# Patient Record
Sex: MALE | Race: WHITE | NOT HISPANIC OR LATINO | Employment: STUDENT | URBAN - METROPOLITAN AREA
[De-identification: names, ages, dates, MRNs, and addresses within clinical notes are randomized per-mention and may not be internally consistent; named-entity substitution may affect disease eponyms.]

---

## 2017-03-22 ENCOUNTER — OFFICE VISIT (OUTPATIENT)
Dept: FAMILY MEDICINE | Facility: CLINIC | Age: 22
End: 2017-03-22
Payer: COMMERCIAL

## 2017-03-22 VITALS
BODY MASS INDEX: 31.88 KG/M2 | TEMPERATURE: 98 F | WEIGHT: 222.69 LBS | DIASTOLIC BLOOD PRESSURE: 73 MMHG | HEART RATE: 69 BPM | SYSTOLIC BLOOD PRESSURE: 120 MMHG | HEIGHT: 70 IN

## 2017-03-22 DIAGNOSIS — N45.1 EPIDIDYMITIS: Primary | ICD-10-CM

## 2017-03-22 PROCEDURE — 99999 PR PBB SHADOW E&M-EST. PATIENT-LVL III: CPT | Mod: PBBFAC,,, | Performed by: FAMILY MEDICINE

## 2017-03-22 PROCEDURE — 99213 OFFICE O/P EST LOW 20 MIN: CPT | Mod: S$GLB,,, | Performed by: FAMILY MEDICINE

## 2017-03-22 PROCEDURE — 1160F RVW MEDS BY RX/DR IN RCRD: CPT | Mod: S$GLB,,, | Performed by: FAMILY MEDICINE

## 2017-03-22 RX ORDER — DICLOFENAC SODIUM 50 MG/1
50 TABLET, DELAYED RELEASE ORAL 2 TIMES DAILY
Qty: 30 TABLET | Refills: 1 | Status: SHIPPED | OUTPATIENT
Start: 2017-03-22 | End: 2017-06-29

## 2017-03-22 NOTE — PROGRESS NOTES
The patient presents with tender painful rt testicle after playing softball last night     but no fever.  ROS:  General: No fever or sig wt change  HEENT:No other PND eye pain or dc  Respiratory: No cough wheezing  PE: vital signs noted  HEENT: Normocephalic,with no recent trauma,PERRLA,EOMI,conjunctiva injected with no exudate.Nasopharynx is injected and edematous.External otic canal edematous and injected TM dull  Neck:Supple without adenopathy  Chest:Clear bilateral breath sounds    Heart:Regular rhthym without murmer  Abdomen:Soft, non tender,no masses, no hepatosplenomegaly  Extremeties and Neurologic:Grossly within normal limits   : Nl appearing testicles w post rt hemiscrotal tenderness   No palpable ballotment   Impression:Epididymitis   Plan:   Rest nsaid and if ftp  con

## 2017-06-29 ENCOUNTER — OFFICE VISIT (OUTPATIENT)
Dept: FAMILY MEDICINE | Facility: CLINIC | Age: 22
End: 2017-06-29
Payer: COMMERCIAL

## 2017-06-29 VITALS
BODY MASS INDEX: 34.32 KG/M2 | TEMPERATURE: 98 F | SYSTOLIC BLOOD PRESSURE: 119 MMHG | HEIGHT: 69 IN | HEART RATE: 68 BPM | WEIGHT: 231.69 LBS | DIASTOLIC BLOOD PRESSURE: 66 MMHG

## 2017-06-29 DIAGNOSIS — K13.79 MOUTH SORE: Primary | ICD-10-CM

## 2017-06-29 DIAGNOSIS — R68.84 JAW PAIN: ICD-10-CM

## 2017-06-29 PROCEDURE — 99213 OFFICE O/P EST LOW 20 MIN: CPT | Mod: S$GLB,,, | Performed by: NURSE PRACTITIONER

## 2017-06-29 PROCEDURE — 99999 PR PBB SHADOW E&M-EST. PATIENT-LVL III: CPT | Mod: PBBFAC,,, | Performed by: NURSE PRACTITIONER

## 2017-06-29 RX ORDER — IBUPROFEN 800 MG/1
800 TABLET ORAL 3 TIMES DAILY
Qty: 21 TABLET | Refills: 0 | Status: SHIPPED | OUTPATIENT
Start: 2017-06-29 | End: 2017-07-06

## 2017-06-29 NOTE — PROGRESS NOTES
Subjective:       Patient ID: Melquiades Washington Jr. is a 21 y.o. male.    Chief Complaint: Other (mouth sore)    Pt in today for sore to right inner cheek. Pt states that he bit his jaw in his sleep last night and noticed a small sore to his inner cheek. He states that he has gargled with listerine, peroxide; states pain has worsened throughout the day. Pt has no other complaints today.       Past Medical History:   Diagnosis Date    Allergy      Social History     Social History    Marital status: Single     Spouse name: N/A    Number of children: N/A    Years of education: N/A     Occupational History         Social History Main Topics    Smoking status: Never Smoker    Smokeless tobacco: Not on file    Alcohol use No    Drug use: No    Sexual activity: Not on file     Past Surgical History:   Procedure Laterality Date    APPENDECTOMY  5/2011       Review of Systems   Constitutional: Negative.    HENT: Positive for mouth sores.    Eyes: Negative.    Respiratory: Negative.    Cardiovascular: Negative.    Gastrointestinal: Negative.    Endocrine: Negative.    Genitourinary: Negative.    Musculoskeletal: Negative.    Skin: Negative.    Allergic/Immunologic: Negative.    Neurological: Negative.    Psychiatric/Behavioral: Negative.        Objective:      Physical Exam   Constitutional: He is oriented to person, place, and time. He appears well-developed and well-nourished.   HENT:   Head: Normocephalic.   Right Ear: External ear normal.   Left Ear: External ear normal.   Nose: Nose normal.   Mouth/Throat: Uvula is midline and oropharynx is clear and moist.       Eyes: Conjunctivae are normal. Pupils are equal, round, and reactive to light.   Neck: Normal range of motion.   Cardiovascular: Normal rate, regular rhythm and normal heart sounds.    Pulmonary/Chest: Effort normal and breath sounds normal.   Abdominal: Soft. Bowel sounds are normal.   Musculoskeletal: Normal range of motion.   Neurological: He is alert  and oriented to person, place, and time.   Skin: Skin is warm and dry. Capillary refill takes 2 to 3 seconds.   Psychiatric: He has a normal mood and affect. His behavior is normal. Judgment and thought content normal.   Nursing note and vitals reviewed.      Assessment:       1. Mouth sore    2. Jaw pain        Plan:           Melquiades was seen today for other.    Diagnoses and all orders for this visit:    Mouth sore  Jaw pain  -     ibuprofen (ADVIL,MOTRIN) 800 MG tablet; Take 1 tablet (800 mg total) by mouth 3 (three) times daily.  -     (Magic mouthwash) 1:1:1 Benadryl 12.5mg/5ml liq, aluminum & magnesium hydroxide-simehticone (Maalox), lidocaine viscous 2%; Swish and spit 5 mLs every 8 (eight) hours as needed. for mouth sores  RTC if symptoms worsen or persist

## 2017-12-01 ENCOUNTER — OFFICE VISIT (OUTPATIENT)
Dept: FAMILY MEDICINE | Facility: CLINIC | Age: 22
End: 2017-12-01
Payer: COMMERCIAL

## 2017-12-01 VITALS
WEIGHT: 217 LBS | HEART RATE: 53 BPM | BODY MASS INDEX: 31.07 KG/M2 | TEMPERATURE: 98 F | SYSTOLIC BLOOD PRESSURE: 128 MMHG | DIASTOLIC BLOOD PRESSURE: 62 MMHG | HEIGHT: 70 IN

## 2017-12-01 DIAGNOSIS — R36.9 PENILE DISCHARGE: ICD-10-CM

## 2017-12-01 DIAGNOSIS — R30.0 DYSURIA: Primary | ICD-10-CM

## 2017-12-01 LAB
BILIRUB UR QL STRIP: NEGATIVE
CLARITY UR: CLEAR
COLOR UR: YELLOW
GLUCOSE UR QL STRIP: NEGATIVE
HGB UR QL STRIP: ABNORMAL
KETONES UR QL STRIP: NEGATIVE
LEUKOCYTE ESTERASE UR QL STRIP: NEGATIVE
NITRITE UR QL STRIP: NEGATIVE
PH UR STRIP: 7 [PH] (ref 5–8)
PROT UR QL STRIP: NEGATIVE
SP GR UR STRIP: 1.01 (ref 1–1.03)
URN SPEC COLLECT METH UR: ABNORMAL

## 2017-12-01 PROCEDURE — 87591 N.GONORRHOEAE DNA AMP PROB: CPT

## 2017-12-01 PROCEDURE — 99213 OFFICE O/P EST LOW 20 MIN: CPT | Mod: S$GLB,,, | Performed by: NURSE PRACTITIONER

## 2017-12-01 PROCEDURE — 99999 PR PBB SHADOW E&M-EST. PATIENT-LVL III: CPT | Mod: PBBFAC,,, | Performed by: NURSE PRACTITIONER

## 2017-12-01 PROCEDURE — 81002 URINALYSIS NONAUTO W/O SCOPE: CPT | Mod: PO

## 2017-12-01 PROCEDURE — 87086 URINE CULTURE/COLONY COUNT: CPT

## 2017-12-01 NOTE — PROGRESS NOTES
Subjective:      Patient ID: Melquiades Washington Jr. is a 22 y.o. male.    Chief Complaint: Dysuria and Urinary Frequency    Dysuria    This is a new problem. The current episode started in the past 7 days (5 days). The problem occurs every urination. The problem has been gradually worsening. The quality of the pain is described as burning. The patient is experiencing no pain. There has been no fever. He is sexually active. There is no history of pyelonephritis. Associated symptoms include frequency and urgency. Pertinent negatives include no behavior changes, chills, flank pain, hematuria, hesitancy, nausea, vomiting, constipation or rash. He has tried nothing for the symptoms. The treatment provided no relief. There is no history of kidney stones, recurrent UTIs or STD.   Penile Discharge   The patient's primary symptoms include penile discharge and penile pain. The patient's pertinent negatives include no genital lesions, scrotal swelling or testicular pain. This is a new problem. The current episode started in the past 7 days (5 days). The problem has been waxing and waning. The patient is experiencing no pain. Associated symptoms include dysuria, frequency and urgency. Pertinent negatives include no chest pain, chills, constipation, coughing, fever, flank pain, hesitancy, nausea, rash, shortness of breath or vomiting. The patient's penis is circumcised.The penile discharge was white. Nothing aggravates the symptoms. He has tried nothing for the symptoms. He is sexually active. He never uses condoms. No, his partner does not have an STD. There is no history of kidney stones.     Review of Systems   Constitutional: Negative for chills, fatigue and fever.   HENT: Negative.    Eyes: Negative.    Respiratory: Negative for cough, shortness of breath and wheezing.    Cardiovascular: Negative for chest pain, palpitations and leg swelling.   Gastrointestinal: Negative for constipation, nausea and vomiting.   Genitourinary:  "Positive for discharge, dysuria, frequency, penile pain and urgency. Negative for flank pain, hematuria, hesitancy, scrotal swelling and testicular pain.   Skin: Negative for rash and wound.   Neurological: Negative for weakness and numbness.   Psychiatric/Behavioral: The patient is not nervous/anxious.        Objective:     /62   Pulse (!) 53   Temp 98.3 °F (36.8 °C) (Oral)   Ht 5' 10" (1.778 m)   Wt 98.4 kg (217 lb)   BMI 31.14 kg/m²     Physical Exam   Constitutional: He is oriented to person, place, and time. He appears well-developed and well-nourished.   HENT:   Head: Normocephalic.   Eyes: Pupils are equal, round, and reactive to light.   Cardiovascular: Normal rate, regular rhythm and normal heart sounds.    Pulmonary/Chest: Effort normal and breath sounds normal. No respiratory distress. He has no wheezes. He has no rales.   Abdominal: Soft. Bowel sounds are normal. He exhibits no distension and no mass. There is tenderness (very mild tenderness) in the right lower quadrant and left lower quadrant. There is no rebound, no guarding and no CVA tenderness. Hernia confirmed negative in the right inguinal area and confirmed negative in the left inguinal area.   Genitourinary: Testes normal and penis normal. Circumcised.   Musculoskeletal: Normal range of motion.   Lymphadenopathy:     He has no cervical adenopathy.   Neurological: He is alert and oriented to person, place, and time.   Skin: Skin is warm and dry. No rash noted.   Psychiatric: He has a normal mood and affect. His behavior is normal. Judgment and thought content normal.   Vitals reviewed.    Assessment:     1. Dysuria    2. Penile discharge        Plan:   Dysuria  -     Urinalysis  -     Urine culture  -     C. trachomatis/N. gonorrhoeae by AMP DNA Urine    Penile discharge  -     C. trachomatis/N. gonorrhoeae by AMP DNA Urine    Increase fluids  Report to ER if symptoms worsen  Return if symptoms worsen or fail to improve.        "

## 2017-12-01 NOTE — PATIENT INSTRUCTIONS
"  Dysuria     Painful urination (dysuria) is often caused by a problem in the urinary tract.   Dysuria is pain felt during urination. It is often described as a burning. Learn more about this problem and how it can be treated.  What causes dysuria?  Possible causes include:  · Infection with a bacteria or virus such as a urinary tract infection (UTI or a sexually transmitted infection (STI)  · Sensitivity or allergy to chemicals such as those found in lotions and other products  · Prostate or bladder problems  · Radiation therapy to the pelvic area  How is dysuria diagnosed?  Your healthcare provider will examine you. He or she will ask about your symptoms and health. After talking with you and doing a physical exam, your healthcare provider may know what is causing your dysuria. He or she will usually request  a sample of your urine. Tests of your urine, or a "urinalysis," are done. A urinalysis may include:  · Looking at the urine sample (visual exam)  · Checking for substances (chemical exam)  · Looking at a small amount under a microscope (microscopic exam)  Some parts of the urinalysis may be done in the provider's office and some in a lab. And, the urine sample may be checked for bacteria and yeast (urine culture). Your healthcare provider will tell you more about these tests if they are needed.  How is dysuria treated?  Treatment depends on the cause. If you have a bacterial infection, you may need antibiotics. You may be given medicines to make it easier for you to urinate and help relieve pain. Your healthcare provider can tell you more about your treatment options. Untreated, symptoms may get worse.  When to call your healthcare provider  Call the healthcare provider right away if you have any of the following:  · Fever of 100.4°F (38°C) or higher   · No improvement after three days of treatment  · Trouble urinating because of pain  · New or increased discharge from the vagina or penis  · Rash or joint " pain  · Increased back or abdominal pain  · Enlarged painful lymph nodes (lumps) in the groin   Date Last Reviewed: 1/1/2017  © 7410-9775 [employer], [employer]. [address] [place] [address], [place] [address]. All rights reserved. This information is not intended as a substitute for professional medical care. Always follow your healthcare professional's instructions.

## 2017-12-02 LAB
BACTERIA UR CULT: NO GROWTH
C TRACH DNA SPEC QL NAA+PROBE: NOT DETECTED
N GONORRHOEA DNA SPEC QL NAA+PROBE: NOT DETECTED

## 2017-12-05 DIAGNOSIS — R30.0 DYSURIA: Primary | ICD-10-CM

## 2018-01-29 ENCOUNTER — OFFICE VISIT (OUTPATIENT)
Dept: FAMILY MEDICINE | Facility: CLINIC | Age: 23
End: 2018-01-29
Payer: COMMERCIAL

## 2018-01-29 VITALS
TEMPERATURE: 99 F | WEIGHT: 202.81 LBS | BODY MASS INDEX: 29.03 KG/M2 | SYSTOLIC BLOOD PRESSURE: 126 MMHG | DIASTOLIC BLOOD PRESSURE: 69 MMHG | HEART RATE: 76 BPM | HEIGHT: 70 IN

## 2018-01-29 DIAGNOSIS — R50.9 FEVER, UNSPECIFIED FEVER CAUSE: ICD-10-CM

## 2018-01-29 DIAGNOSIS — J06.9 UPPER RESPIRATORY TRACT INFECTION, UNSPECIFIED TYPE: Primary | ICD-10-CM

## 2018-01-29 DIAGNOSIS — J02.9 PHARYNGITIS, UNSPECIFIED ETIOLOGY: ICD-10-CM

## 2018-01-29 LAB
CTP QC/QA: YES
FLUAV AG NPH QL: NEGATIVE
FLUBV AG NPH QL: NEGATIVE

## 2018-01-29 PROCEDURE — 99999 PR PBB SHADOW E&M-EST. PATIENT-LVL III: CPT | Mod: PBBFAC,,, | Performed by: NURSE PRACTITIONER

## 2018-01-29 PROCEDURE — 87804 INFLUENZA ASSAY W/OPTIC: CPT | Mod: 59,QW,S$GLB, | Performed by: NURSE PRACTITIONER

## 2018-01-29 PROCEDURE — 87081 CULTURE SCREEN ONLY: CPT

## 2018-01-29 PROCEDURE — 99213 OFFICE O/P EST LOW 20 MIN: CPT | Mod: S$GLB,,, | Performed by: NURSE PRACTITIONER

## 2018-01-29 RX ORDER — NAPROXEN 500 MG/1
500 TABLET ORAL
COMMUNITY
Start: 2017-12-10 | End: 2018-09-24

## 2018-01-29 RX ORDER — AZITHROMYCIN 250 MG/1
TABLET, FILM COATED ORAL
Refills: 0 | COMMUNITY
Start: 2018-01-25 | End: 2018-09-24

## 2018-01-29 RX ORDER — ONDANSETRON 4 MG/1
4 TABLET, FILM COATED ORAL EVERY 8 HOURS PRN
Qty: 15 TABLET | Refills: 0 | Status: CANCELLED | OUTPATIENT
Start: 2018-01-29 | End: 2018-02-03

## 2018-01-29 NOTE — PROGRESS NOTES
"Subjective:      Patient ID: Melquiades Washington Jr. is a 22 y.o. male.    Chief Complaint: Sore Throat; Fever; and Diarrhea    HPI   Patient to clinic with complaint of fever, sore throat, post nasal drip, and diarrhea.  He reports he had a sore throat, post nasal drip, and nasal congestion Wednesday and Thursday of last week.  He was seen at an urgent care and given a Z-pack.  He reports he began to feel worse Saturday night and Sunday and had fever and then nausea and diarrhea.  He denies abdominal pain, blood in stool and dark stools.  He now has a cough, body aches, ear pain, and fatigue as well.  He has also been taking an OTC antihistamine.    Review of Systems   Constitutional: Positive for chills, fatigue and fever.   HENT: Positive for ear pain, postnasal drip, rhinorrhea, sore throat and trouble swallowing. Negative for congestion and sinus pressure.    Eyes: Negative.    Respiratory: Positive for cough. Negative for shortness of breath and wheezing.    Cardiovascular: Negative for chest pain, palpitations and leg swelling.   Gastrointestinal: Positive for diarrhea and nausea. Negative for abdominal pain, blood in stool, constipation and vomiting.   Endocrine: Negative.    Genitourinary: Negative.    Musculoskeletal: Positive for myalgias.   Skin: Negative for rash and wound.   Neurological: Negative for headaches.   Hematological: Negative.    Psychiatric/Behavioral: The patient is not nervous/anxious.        Objective:     /69   Pulse 76   Temp 98.6 °F (37 °C) (Oral)   Ht 5' 10" (1.778 m)   Wt 92 kg (202 lb 13.2 oz)   BMI 29.10 kg/m²     Physical Exam   Constitutional: He appears well-developed and well-nourished.   HENT:   Head: Normocephalic.   Right Ear: Tympanic membrane normal.   Left Ear: Tympanic membrane is injected.   Nose: Rhinorrhea (nasal mucosa boggy) present. No mucosal edema. Right sinus exhibits no maxillary sinus tenderness and no frontal sinus tenderness. Left sinus exhibits no " maxillary sinus tenderness and no frontal sinus tenderness.   Mouth/Throat: Posterior oropharyngeal edema (mild) and posterior oropharyngeal erythema (clear, post nasal drainage noted to posterior oropharynx) present. No oropharyngeal exudate. Tonsils are 2+ on the right. Tonsils are 2+ on the left. No tonsillar exudate.   Eyes: Conjunctivae and lids are normal. Pupils are equal, round, and reactive to light.   Neck: Normal range of motion and full passive range of motion without pain. Neck supple.   Cardiovascular: Normal rate, regular rhythm and normal heart sounds.    Pulmonary/Chest: Effort normal and breath sounds normal. No respiratory distress. He has no decreased breath sounds. He has no wheezes. He has no rhonchi. He has no rales.   Lymphadenopathy:     He has no cervical adenopathy.   Skin: Skin is warm and dry. No rash noted.   Psychiatric: He has a normal mood and affect. His behavior is normal. Judgment and thought content normal.     POCT influenza negative  Strep test negative, culture pending    Assessment:     1. Upper respiratory tract infection, unspecified type    2. Pharyngitis, unspecified etiology    3. Fever, unspecified fever cause        Plan:     Problem List Items Addressed This Visit     None      Visit Diagnoses     Upper respiratory tract infection, unspecified type    -  Primary    Pharyngitis, unspecified etiology        Relevant Orders    Strep A culture, throat    Fever, unspecified fever cause        Relevant Orders    POCT Influenza A/B (Completed)    THROAT SCREEN, RAPID    Strep A culture, throat      Increase fluid intake  Rest  Tylenol/Motrin as needed for headache/pain/fever  OTC decongestant like Sudafed for congestion  Mucinex (guaifenesin) twice daily to loosen secretions  Humidified air  Lozenges and/or chloraseptic spray as needed for sore throat  Warm salt water gargles as needed for sore throat  Symptomatic care discussed and educational handout provided  Report to ER  if symptoms worsen    Follow-up if symptoms worsen or fail to improve.

## 2018-01-29 NOTE — PATIENT INSTRUCTIONS
Increase fluid intake  Rest  Tylenol/Motrin as needed for headache/pain/fever   OTC decongestant like Sudafed for congestion  Mucinex (guaifenesin) twice daily to loosen secretions  Humidified air  Lozenges and/or chloraseptic spray as needed for sore throat  Warm salt water gargles as needed for sore throat

## 2018-02-01 LAB — BACTERIA THROAT CULT: NORMAL

## 2018-09-24 ENCOUNTER — OFFICE VISIT (OUTPATIENT)
Dept: FAMILY MEDICINE | Facility: CLINIC | Age: 23
End: 2018-09-24
Payer: COMMERCIAL

## 2018-09-24 VITALS
WEIGHT: 200 LBS | HEART RATE: 71 BPM | DIASTOLIC BLOOD PRESSURE: 57 MMHG | SYSTOLIC BLOOD PRESSURE: 109 MMHG | HEIGHT: 70 IN | BODY MASS INDEX: 28.63 KG/M2 | TEMPERATURE: 99 F

## 2018-09-24 DIAGNOSIS — R10.33 ACUTE PERIUMBILICAL PAIN: Primary | ICD-10-CM

## 2018-09-24 PROCEDURE — 99999 PR PBB SHADOW E&M-EST. PATIENT-LVL III: CPT | Mod: PBBFAC,,, | Performed by: NURSE PRACTITIONER

## 2018-09-24 PROCEDURE — 99213 OFFICE O/P EST LOW 20 MIN: CPT | Mod: S$GLB,,, | Performed by: NURSE PRACTITIONER

## 2018-09-24 PROCEDURE — 3008F BODY MASS INDEX DOCD: CPT | Mod: CPTII,S$GLB,, | Performed by: NURSE PRACTITIONER

## 2018-09-24 NOTE — PROGRESS NOTES
"Subjective:      Patient ID: Melquiades Washington Jr. is a 23 y.o. male.    Chief Complaint: Abdominal Pain    Abdominal Pain   This is a new problem. The current episode started today. The onset quality is sudden. The problem occurs intermittently. The problem has been waxing and waning. The pain is located in the periumbilical region. The pain is moderate. The quality of the pain is sharp. The abdominal pain does not radiate. Pertinent negatives include no belching, constipation, diarrhea, dysuria, fever, flatus, frequency, headaches, hematuria, melena, myalgias, nausea or vomiting. The pain is aggravated by movement (increased intraabdominal pressure). Relieved by: relaxation  He has tried nothing for the symptoms. The treatment provided no relief.   On Saturday he lifted heavy ice chests and had a slip and fall pushing an ice chest out of a truck.      Review of Systems   Constitutional: Negative for chills, fatigue and fever.   Respiratory: Negative for cough, shortness of breath and wheezing.    Cardiovascular: Negative for chest pain, palpitations and leg swelling.   Gastrointestinal: Positive for abdominal pain. Negative for abdominal distention, constipation, diarrhea, flatus, melena, nausea and vomiting.   Genitourinary: Negative for dysuria, frequency and hematuria.   Musculoskeletal: Negative for myalgias.   Skin: Negative for rash and wound.   Neurological: Negative for weakness, numbness and headaches.   Psychiatric/Behavioral: The patient is not nervous/anxious.        Objective:     BP (!) 109/57   Pulse 71   Temp 98.5 °F (36.9 °C) (Oral)   Ht 5' 10" (1.778 m)   Wt 90.7 kg (200 lb)   BMI 28.70 kg/m²     Physical Exam   Constitutional: He is oriented to person, place, and time. He appears well-developed and well-nourished.   HENT:   Head: Normocephalic.   Eyes: Pupils are equal, round, and reactive to light.   Neck: Normal range of motion. Neck supple.   Cardiovascular: Normal rate, regular rhythm and " normal heart sounds.   Pulmonary/Chest: Effort normal and breath sounds normal. No respiratory distress. He has no decreased breath sounds. He has no wheezes. He has no rhonchi. He has no rales.   Abdominal: Soft. Bowel sounds are normal. He exhibits no distension and no mass. There is tenderness in the periumbilical area. There is no rigidity, no rebound, no guarding and no CVA tenderness. No hernia.   Neurological: He is alert and oriented to person, place, and time.   Skin: Skin is warm and dry. No rash noted.   Psychiatric: He has a normal mood and affect. His behavior is normal. Judgment and thought content normal.   Vitals reviewed.    Assessment:     1. Acute periumbilical pain        Plan:     Problem List Items Addressed This Visit     None      Visit Diagnoses     Acute periumbilical pain    -  Primary    Relevant Orders    US Abdomen Limited      Periumbilical pain caused by hernia vs strained muscle vs other etiology  Report to ER if symptoms worsen    Follow-up if symptoms worsen or fail to improve.        Parts of this note was dictated using voice recognition software. Please excuse any grammatical or typographical errors.

## 2018-09-25 ENCOUNTER — HOSPITAL ENCOUNTER (OUTPATIENT)
Dept: RADIOLOGY | Facility: HOSPITAL | Age: 23
Discharge: HOME | End: 2018-09-25
Attending: NURSE PRACTITIONER
Payer: COMMERCIAL

## 2018-09-25 DIAGNOSIS — R10.33 ACUTE PERIUMBILICAL PAIN: ICD-10-CM

## 2018-09-25 PROCEDURE — 76705 ECHO EXAM OF ABDOMEN: CPT | Mod: TC,PO

## 2018-09-25 PROCEDURE — 76705 ECHO EXAM OF ABDOMEN: CPT | Mod: 26,,, | Performed by: RADIOLOGY

## 2019-07-10 ENCOUNTER — OFFICE VISIT (OUTPATIENT)
Dept: FAMILY MEDICINE | Facility: CLINIC | Age: 24
End: 2019-07-10
Payer: COMMERCIAL

## 2019-07-10 VITALS
TEMPERATURE: 98 F | HEART RATE: 57 BPM | SYSTOLIC BLOOD PRESSURE: 95 MMHG | WEIGHT: 226.81 LBS | BODY MASS INDEX: 32.47 KG/M2 | DIASTOLIC BLOOD PRESSURE: 56 MMHG | HEIGHT: 70 IN

## 2019-07-10 DIAGNOSIS — R00.2 PALPITATIONS: ICD-10-CM

## 2019-07-10 DIAGNOSIS — F43.9 STRESS: ICD-10-CM

## 2019-07-10 DIAGNOSIS — K21.9 GASTROESOPHAGEAL REFLUX DISEASE, ESOPHAGITIS PRESENCE NOT SPECIFIED: ICD-10-CM

## 2019-07-10 DIAGNOSIS — R07.9 CHEST PAIN, UNSPECIFIED TYPE: Primary | ICD-10-CM

## 2019-07-10 PROCEDURE — 3008F BODY MASS INDEX DOCD: CPT | Mod: CPTII,S$GLB,, | Performed by: FAMILY MEDICINE

## 2019-07-10 PROCEDURE — 99214 PR OFFICE/OUTPT VISIT, EST, LEVL IV, 30-39 MIN: ICD-10-PCS | Mod: S$GLB,,, | Performed by: FAMILY MEDICINE

## 2019-07-10 PROCEDURE — 99999 PR PBB SHADOW E&M-EST. PATIENT-LVL III: CPT | Mod: PBBFAC,,, | Performed by: FAMILY MEDICINE

## 2019-07-10 PROCEDURE — 93010 ELECTROCARDIOGRAM REPORT: CPT | Mod: S$GLB,,, | Performed by: INTERNAL MEDICINE

## 2019-07-10 PROCEDURE — 99999 PR PBB SHADOW E&M-EST. PATIENT-LVL III: ICD-10-PCS | Mod: PBBFAC,,, | Performed by: FAMILY MEDICINE

## 2019-07-10 PROCEDURE — 93005 EKG 12-LEAD: ICD-10-PCS | Mod: S$GLB,,, | Performed by: FAMILY MEDICINE

## 2019-07-10 PROCEDURE — 93005 ELECTROCARDIOGRAM TRACING: CPT | Mod: S$GLB,,, | Performed by: FAMILY MEDICINE

## 2019-07-10 PROCEDURE — 3008F PR BODY MASS INDEX (BMI) DOCUMENTED: ICD-10-PCS | Mod: CPTII,S$GLB,, | Performed by: FAMILY MEDICINE

## 2019-07-10 PROCEDURE — 99214 OFFICE O/P EST MOD 30 MIN: CPT | Mod: S$GLB,,, | Performed by: FAMILY MEDICINE

## 2019-07-10 PROCEDURE — 93010 EKG 12-LEAD: ICD-10-PCS | Mod: S$GLB,,, | Performed by: INTERNAL MEDICINE

## 2019-07-10 RX ORDER — PANTOPRAZOLE SODIUM 40 MG/1
40 TABLET, DELAYED RELEASE ORAL DAILY
Qty: 30 TABLET | Refills: 0 | Status: SHIPPED | OUTPATIENT
Start: 2019-07-10 | End: 2020-01-22

## 2019-07-10 NOTE — PROGRESS NOTES
Patient presents with a complaint of intermittent chest discomfort.  Symptoms are relatively constant over the past 3 and half days though occasionally resolves.  Possibly has had some palpitation or heart racing.  Feels like he is having symptoms currently.  Denies exertional chest pain.  He did feel like symptoms radiate somewhat towards the jaw teeth and left shoulder area.  Achy sensation.  Seems to be better after eating.  States he has had some indigestion and some intermittent mild reflux sensation past few days.  He did have excess alcohol over the weekend with 10-15 beers on Saturday.  States usually drinks about 3 or 4.  Denies any abdominal pain.  No nausea or vomiting.  No shortness of breath.  He has been under some stress related to a relationship break-up about 6 months ago as well as his father passing away about 2 years ago.  Denies being chronically depressed.  No SI/HI.  Denies chronic anxiety.  Some stress related to finances.    Melquiades was seen today for chest discomfort.    Diagnoses and all orders for this visit:    Chest pain, unspecified type  -     EKG 12-lead  -     Cardiac treadmill stress test; Future    Palpitations  -     EKG 12-lead  -     Cardiac treadmill stress test; Future    Gastroesophageal reflux disease, esophagitis presence not specified    Stress    Other orders  -     pantoprazole (PROTONIX) 40 MG tablet; Take 1 tablet (40 mg total) by mouth once daily.      EKG today sinus bradycardia with sinus arrhythmia otherwise unremarkable.  This is with palpitation symptoms.  Arrange stress testing as above.  Treat for reflux.  Avoid excess alcohol.  He does not feel having a know if stress type symptoms for medication at this point.  Follow up if new symptoms develop symptoms worsen or not improving with above.            Past Medical History:  Past Medical History:   Diagnosis Date    Allergy      Past Surgical History:   Procedure Laterality Date    APPENDECTOMY  5/2011      Review of patient's allergies indicates:   Allergen Reactions    Amoxicillin      Other reaction(s): Rash    Bactrim  [sulfamethoxazole-trimethoprim]      Other reaction(s): Hives    Penicillins      Other reaction(s): Rash  Other reaction(s): Hives    Sulfa (sulfonamide antibiotics)      Other reaction(s): Hives  Other reaction(s): Edema     No current outpatient medications on file prior to visit.     No current facility-administered medications on file prior to visit.      Social History     Socioeconomic History    Marital status: Single     Spouse name: Not on file    Number of children: Not on file    Years of education: Not on file    Highest education level: Not on file   Occupational History    Not on file   Social Needs    Financial resource strain: Not on file    Food insecurity:     Worry: Not on file     Inability: Not on file    Transportation needs:     Medical: Not on file     Non-medical: Not on file   Tobacco Use    Smoking status: Current Every Day Smoker     Types: Vaping with nicotine   Substance and Sexual Activity    Alcohol use: Yes     Drinks per session: 3 or 4    Drug use: No    Sexual activity: Not on file   Lifestyle    Physical activity:     Days per week: Not on file     Minutes per session: Not on file    Stress: Not on file   Relationships    Social connections:     Talks on phone: Not on file     Gets together: Not on file     Attends [employer] service: Not on file     Active member of club or organization: Not on file     Attends meetings of clubs or organizations: Not on file     Relationship status: Not on file   Other Topics Concern    Not on file   Social History Narrative    Not on file     Family History   Problem Relation Age of Onset    Diabetes Father     Diabetes Paternal Grandmother            ROS:  GENERAL: No fever, chills,  or significant weight changes.   CARDIOVASCULAR: Denies  PND, orthopnea or reduced exercise tolerance.  ABDOMEN:  "Appetite fine. Denies diarrhea, abdominal pain, hematemesis or blood in stool.  URINARY: No flank pain, dysuria or hematuria.    Vitals:    07/10/19 1043   BP: (!) 95/56   Pulse: (!) 57   Temp: 98.3 °F (36.8 °C)   Weight: 102.9 kg (226 lb 12.8 oz)   Height: 5' 10" (1.778 m)     Wt Readings from Last 3 Encounters:   07/10/19 102.9 kg (226 lb 12.8 oz)   09/24/18 90.7 kg (200 lb)   01/29/18 92 kg (202 lb 13.2 oz)       OBJECTIVE:   APPEARANCE: Well nourished, well developed, in no acute distress.    HEAD: Normocephalic.  Atraumatic.  No sinus tenderness.  EYES:   Right eye: Pupil reactive.  Conjunctiva clear.    Left eye: Pupil reactive.  Conjunctiva clear.  EOMI.    EARS: TM's intact. Light reflex normal. No retraction or perforation.    NOSE:  clear.  MOUTH & THROAT:  No pharyngeal erythema or exudate. No lesions.  NECK: Supple. No bruits.  No JVD.  No cervical lymphadenopathy.  No thyromegaly.    CHEST: Breath sounds clear bilaterally.  Normal respiratory effort  CARDIOVASCULAR: Normal rate.  Regular rhythm.  No murmurs.  No rub.  No gallops.  ABDOMEN: Bowel sounds normal.  Soft.  No tenderness.  No organomegaly.  PERIPHERAL VASCULAR: No cyanosis.  No clubbing.  No edema.  NEUROLOGIC: No focal findings.  MENTAL STATUS: Alert.  Oriented x 3.        "

## 2020-01-22 ENCOUNTER — OFFICE VISIT (OUTPATIENT)
Dept: FAMILY MEDICINE | Facility: CLINIC | Age: 25
End: 2020-01-22
Payer: COMMERCIAL

## 2020-01-22 ENCOUNTER — LAB VISIT (OUTPATIENT)
Dept: LAB | Facility: HOSPITAL | Age: 25
End: 2020-01-22
Attending: NURSE PRACTITIONER
Payer: COMMERCIAL

## 2020-01-22 VITALS
DIASTOLIC BLOOD PRESSURE: 68 MMHG | HEIGHT: 68 IN | TEMPERATURE: 100 F | WEIGHT: 208 LBS | SYSTOLIC BLOOD PRESSURE: 126 MMHG | BODY MASS INDEX: 31.52 KG/M2 | HEART RATE: 98 BPM

## 2020-01-22 DIAGNOSIS — R52 BODY ACHES: ICD-10-CM

## 2020-01-22 DIAGNOSIS — R50.9 FEVER, UNSPECIFIED FEVER CAUSE: ICD-10-CM

## 2020-01-22 DIAGNOSIS — J06.9 UPPER RESPIRATORY TRACT INFECTION, UNSPECIFIED TYPE: Primary | ICD-10-CM

## 2020-01-22 DIAGNOSIS — J02.9 SORE THROAT: ICD-10-CM

## 2020-01-22 LAB
DEPRECATED S PYO AG THROAT QL EIA: NEGATIVE
HETEROPH AB SERPL QL IA: NEGATIVE
INFLUENZA A, MOLECULAR: NEGATIVE
INFLUENZA B, MOLECULAR: NEGATIVE
SPECIMEN SOURCE: NORMAL
WBC # BLD AUTO: 11.07 K/UL (ref 3.9–12.7)

## 2020-01-22 PROCEDURE — 87880 STREP A ASSAY W/OPTIC: CPT | Mod: PO

## 2020-01-22 PROCEDURE — 3008F PR BODY MASS INDEX (BMI) DOCUMENTED: ICD-10-PCS | Mod: CPTII,S$GLB,, | Performed by: NURSE PRACTITIONER

## 2020-01-22 PROCEDURE — 36415 COLL VENOUS BLD VENIPUNCTURE: CPT | Mod: PO

## 2020-01-22 PROCEDURE — 99213 OFFICE O/P EST LOW 20 MIN: CPT | Mod: S$GLB,,, | Performed by: NURSE PRACTITIONER

## 2020-01-22 PROCEDURE — 99999 PR PBB SHADOW E&M-EST. PATIENT-LVL III: ICD-10-PCS | Mod: PBBFAC,,, | Performed by: NURSE PRACTITIONER

## 2020-01-22 PROCEDURE — 85048 AUTOMATED LEUKOCYTE COUNT: CPT

## 2020-01-22 PROCEDURE — 86308 HETEROPHILE ANTIBODY SCREEN: CPT | Mod: PO

## 2020-01-22 PROCEDURE — 87081 CULTURE SCREEN ONLY: CPT

## 2020-01-22 PROCEDURE — 87502 INFLUENZA DNA AMP PROBE: CPT | Mod: PO

## 2020-01-22 PROCEDURE — 3008F BODY MASS INDEX DOCD: CPT | Mod: CPTII,S$GLB,, | Performed by: NURSE PRACTITIONER

## 2020-01-22 PROCEDURE — 99213 PR OFFICE/OUTPT VISIT, EST, LEVL III, 20-29 MIN: ICD-10-PCS | Mod: S$GLB,,, | Performed by: NURSE PRACTITIONER

## 2020-01-22 PROCEDURE — 87147 CULTURE TYPE IMMUNOLOGIC: CPT

## 2020-01-22 PROCEDURE — 99999 PR PBB SHADOW E&M-EST. PATIENT-LVL III: CPT | Mod: PBBFAC,,, | Performed by: NURSE PRACTITIONER

## 2020-01-22 RX ORDER — AZITHROMYCIN 250 MG/1
TABLET, FILM COATED ORAL
Qty: 6 TABLET | Refills: 0 | Status: SHIPPED | OUTPATIENT
Start: 2020-01-22 | End: 2020-01-27

## 2020-01-22 NOTE — PATIENT INSTRUCTIONS
Alternate motrin and tylenol every 4-6h prn  Hydrate well  Report to ER immediately if symptoms worsen

## 2020-01-22 NOTE — PROGRESS NOTES
Subjective:       Patient ID: Melquiades Washington Jr. is a 24 y.o. male.    Chief Complaint: Sore Throat; Cough; Neck Pain; Fever; and Nasal Congestion    Sore Throat    This is a new problem. The current episode started in the past 7 days. The problem has been unchanged. The maximum temperature recorded prior to his arrival was 101 - 101.9 F. The fever has been present for 1 to 2 days. The pain is moderate. Associated symptoms include neck pain, swollen glands and trouble swallowing. Pertinent negatives include no abdominal pain, congestion, coughing, diarrhea, drooling, ear discharge, ear pain, headaches, hoarse voice, plugged ear sensation, shortness of breath, stridor or vomiting. He has had no exposure to strep or mono. He has tried nothing for the symptoms. The treatment provided no relief.     Past Medical History:   Diagnosis Date    Allergy      Social History     Socioeconomic History    Marital status: Single     Spouse name: Not on file    Number of children: Not on file    Years of education: Not on file    Highest education level: Not on file   Occupational History    Not on file   Social Needs    Financial resource strain: Not on file    Food insecurity:     Worry: Not on file     Inability: Not on file    Transportation needs:     Medical: Not on file     Non-medical: Not on file   Tobacco Use    Smoking status: Current Every Day Smoker     Types: Vaping with nicotine   Substance and Sexual Activity    Alcohol use: Yes     Drinks per session: 3 or 4    Drug use: No    Sexual activity: Not on file   Lifestyle    Physical activity:     Days per week: Not on file     Minutes per session: Not on file    Stress: Not on file   Relationships    Social connections:     Talks on phone: Not on file     Gets together: Not on file     Attends Gnosticist service: Not on file     Active member of club or organization: Not on file     Attends meetings of clubs or organizations: Not on file     Relationship  status: Not on file   Other Topics Concern    Not on file   Social History Narrative    Not on file     Past Surgical History:   Procedure Laterality Date    APPENDECTOMY  5/2011       Review of Systems   Constitutional: Negative.    HENT: Positive for sore throat and trouble swallowing. Negative for congestion, drooling, ear discharge, ear pain and hoarse voice.    Eyes: Negative.    Respiratory: Negative.  Negative for cough, shortness of breath and stridor.    Cardiovascular: Negative.    Gastrointestinal: Negative.  Negative for abdominal pain, diarrhea and vomiting.   Endocrine: Negative.    Genitourinary: Negative.    Musculoskeletal: Positive for neck pain.   Skin: Negative.    Allergic/Immunologic: Negative.    Neurological: Negative.  Negative for headaches.   Psychiatric/Behavioral: Negative.        Objective:      Physical Exam   Constitutional: He is oriented to person, place, and time. He appears well-developed and well-nourished.   HENT:   Head: Normocephalic.   Right Ear: Hearing, tympanic membrane, external ear and ear canal normal.   Left Ear: Hearing, tympanic membrane, external ear and ear canal normal.   Nose: Rhinorrhea present. Right sinus exhibits no maxillary sinus tenderness and no frontal sinus tenderness. Left sinus exhibits no maxillary sinus tenderness and no frontal sinus tenderness.   Mouth/Throat: Uvula is midline and mucous membranes are normal. Posterior oropharyngeal erythema present.   Eyes: Pupils are equal, round, and reactive to light. Conjunctivae are normal.   Neck: Normal range of motion. Neck supple.   Cardiovascular: Normal rate, regular rhythm and normal heart sounds.   Pulmonary/Chest: Effort normal and breath sounds normal.   Abdominal: Soft. Bowel sounds are normal.   Musculoskeletal: Normal range of motion.   Neurological: He is alert and oriented to person, place, and time.   Skin: Skin is warm and dry. Capillary refill takes 2 to 3 seconds.   Psychiatric: He has  a normal mood and affect. His behavior is normal. Judgment and thought content normal.   Nursing note and vitals reviewed.      Assessment:       1. Upper respiratory tract infection, unspecified type   2. Body aches    3.      Sore throat   4.      Fever, unspecified fever cause  Plan:           Melquiades was seen today for sore throat, cough, neck pain, fever and nasal congestion.    Diagnoses and all orders for this visit:    Upper respiratory tract infection, unspecified type  Sore throat  Body aches  Fever, unspecified fever cause  -     Throat Screen, Rapid  -     HETEROPHILE AB SCREEN; Future  -     Influenza A & B by Molecular  -     WBC; Future  -     (Magic mouthwash) 1:1:1 Benadryl 12.5mg/5ml liq, aluminum & magnesium hydroxide-simehticone (Maalox), lidocaine viscous 2%; Swish and spit 5 mLs every 8 (eight) hours as needed. Gargle and spit. Do not swallow  -     Strep A culture, throat  -     azithromycin (Z-SHANNA) 250 MG tablet; Take 2 tablets by mouth on day 1; Take 1 tablet by mouth on days 2-5  Alternate motrin and tylenol every 4-6h prn  Hydrate well  Report to ER immediately if symptoms worsen

## 2020-01-23 LAB
BACTERIA THROAT CULT: ABNORMAL
BACTERIA THROAT CULT: ABNORMAL

## 2020-07-14 ENCOUNTER — TELEPHONE (OUTPATIENT)
Dept: FAMILY MEDICINE | Facility: CLINIC | Age: 25
End: 2020-07-14

## 2020-07-14 DIAGNOSIS — U07.1 COVID-19 VIRUS INFECTION: ICD-10-CM

## 2020-07-14 DIAGNOSIS — U07.1 COVID-19 VIRUS DETECTED: ICD-10-CM

## 2020-07-14 NOTE — TELEPHONE ENCOUNTER
We have seen some patients continue to test positive for COVID-19 well after they have recovered from illness, in some cases >80 days. However, the CDC has concluded that patients are likely no longer infectious 10 days after symptom recovery.  . The  CDC has  data  showing that nine days after illness onset, the Covid 19 virus could not be replicated in culture.     Based on this evidence patients with a documented positive COVID-19 test do not need to re-test for clearance. Repeat positive tests are not indicative of continued infectivity. Because the date of symptom onset is not always known, and some patients never develop symptoms, [facility] protocols use 10 days from the first positive result and being symptom-free to determine when someone can be released from isolation.    [facility] now has a lockout period of 90 days where Patients with a documented positive COVID-19 result cannot have another COVID-19 test ordered during that 90-day period.

## 2020-07-14 NOTE — TELEPHONE ENCOUNTER
----- Message from Elbert Ornelas sent at 7/14/2020 10:48 AM CDT -----  Name of Who is Calling: MICHAEL RETANA [[id number]]    What is the request in detail: Patient is requesting a call back regard to getting covid 19 orders....Please contact to further discuss and advise      Can the clinic reply by [facility]: No     What Number to Call Back if not in [facility]:  [phone] (work)  Can the clinic reply in [facility]:

## 2020-12-10 ENCOUNTER — OFFICE VISIT (OUTPATIENT)
Dept: FAMILY MEDICINE | Facility: CLINIC | Age: 25
End: 2020-12-10
Payer: COMMERCIAL

## 2020-12-10 VITALS
SYSTOLIC BLOOD PRESSURE: 112 MMHG | HEIGHT: 68 IN | BODY MASS INDEX: 30.31 KG/M2 | TEMPERATURE: 98 F | HEART RATE: 57 BPM | DIASTOLIC BLOOD PRESSURE: 57 MMHG | WEIGHT: 200 LBS

## 2020-12-10 DIAGNOSIS — J06.9 UPPER RESPIRATORY TRACT INFECTION, UNSPECIFIED TYPE: ICD-10-CM

## 2020-12-10 DIAGNOSIS — Z03.818 ENCOUNTER FOR OBSERVATION FOR SUSPECTED EXPOSURE TO OTHER BIOLOGICAL AGENTS RULED OUT: ICD-10-CM

## 2020-12-10 DIAGNOSIS — J02.9 SORE THROAT: Primary | ICD-10-CM

## 2020-12-10 LAB — GROUP A STREP, MOLECULAR: NEGATIVE

## 2020-12-10 PROCEDURE — 99999 PR PBB SHADOW E&M-EST. PATIENT-LVL III: CPT | Mod: PBBFAC,CS,, | Performed by: FAMILY MEDICINE

## 2020-12-10 PROCEDURE — 99213 PR OFFICE/OUTPT VISIT, EST, LEVL III, 20-29 MIN: ICD-10-PCS | Mod: S$GLB,CS,, | Performed by: FAMILY MEDICINE

## 2020-12-10 PROCEDURE — 99213 OFFICE O/P EST LOW 20 MIN: CPT | Mod: S$GLB,CS,, | Performed by: FAMILY MEDICINE

## 2020-12-10 PROCEDURE — 3008F PR BODY MASS INDEX (BMI) DOCUMENTED: ICD-10-PCS | Mod: CPTII,S$GLB,, | Performed by: FAMILY MEDICINE

## 2020-12-10 PROCEDURE — 87651 STREP A DNA AMP PROBE: CPT | Mod: PO

## 2020-12-10 PROCEDURE — 3008F BODY MASS INDEX DOCD: CPT | Mod: CPTII,S$GLB,, | Performed by: FAMILY MEDICINE

## 2020-12-10 PROCEDURE — U0003 INFECTIOUS AGENT DETECTION BY NUCLEIC ACID (DNA OR RNA); SEVERE ACUTE RESPIRATORY SYNDROME CORONAVIRUS 2 (SARS-COV-2) (CORONAVIRUS DISEASE [COVID-19]), AMPLIFIED PROBE TECHNIQUE, MAKING USE OF HIGH THROUGHPUT TECHNOLOGIES AS DESCRIBED BY CMS-2020-01-R: HCPCS

## 2020-12-10 PROCEDURE — 99999 PR PBB SHADOW E&M-EST. PATIENT-LVL III: ICD-10-PCS | Mod: PBBFAC,CS,, | Performed by: FAMILY MEDICINE

## 2020-12-10 PROCEDURE — 1126F PR PAIN SEVERITY QUANTIFIED, NO PAIN PRESENT: ICD-10-PCS | Mod: S$GLB,,, | Performed by: FAMILY MEDICINE

## 2020-12-10 PROCEDURE — 1126F AMNT PAIN NOTED NONE PRSNT: CPT | Mod: S$GLB,,, | Performed by: FAMILY MEDICINE

## 2020-12-11 LAB — SARS-COV-2 RNA RESP QL NAA+PROBE: NOT DETECTED

## 2020-12-11 NOTE — PROGRESS NOTES
Patient complains of sore throat, mild congestion, postnasal drainage, no cough, no fever.  Symptoms started over the past few days.  Current treatment over-the-counter medication.  He had COVID-19 back during the summer.  Denies anosmia.  No diarrhea or headache.    Past Medical History:  Past Medical History:   Diagnosis Date    Allergy     COVID-19 virus infection 6/29/2020     Past Surgical History:   Procedure Laterality Date    APPENDECTOMY  5/2011     Review of patient's allergies indicates:   Allergen Reactions    Amoxicillin      Other reaction(s): Rash    Bactrim  [sulfamethoxazole-trimethoprim]      Other reaction(s): Hives    Penicillins      Other reaction(s): Rash  Other reaction(s): Hives    Sulfa (sulfonamide antibiotics)      Other reaction(s): Hives  Other reaction(s): Edema     No current outpatient medications on file prior to visit.     No current facility-administered medications on file prior to visit.      Social History     Socioeconomic History    Marital status: Single     Spouse name: Not on file    Number of children: Not on file    Years of education: Not on file    Highest education level: Not on file   Occupational History    Not on file   Social Needs    Financial resource strain: Not on file    Food insecurity     Worry: Not on file     Inability: Not on file    Transportation needs     Medical: Not on file     Non-medical: Not on file   Tobacco Use    Smoking status: Current Every Day Smoker     Types: Vaping with nicotine   Substance and Sexual Activity    Alcohol use: Yes     Drinks per session: 3 or 4    Drug use: No    Sexual activity: Not on file   Lifestyle    Physical activity     Days per week: Not on file     Minutes per session: Not on file    Stress: Not on file   Relationships    Social connections     Talks on phone: Not on file     Gets together: Not on file     Attends Congregational service: Not on file     Active member of club or organization:  "Not on file     Attends meetings of clubs or organizations: Not on file     Relationship status: Not on file   Other Topics Concern    Not on file   Social History Narrative    Not on file     Family History   Problem Relation Age of Onset    Diabetes Father     Diabetes Paternal Grandmother              Physical Exam:  Vitals:    12/10/20 0906   BP: (!) 112/57   Pulse: (!) 57   Temp: 98.2 °F (36.8 °C)   Weight: 90.7 kg (200 lb)   Height: 5' 8" (1.727 m)       Gen.: No acute distress  HEENT: sinuses nontender. Ears: Tympanic membranes intact.  No erythema or effusion.  Nose mild congestion.  Throat mild erythema no exudate.  Mild postnasal drainage.  Neck supple no adenopathy  Chest: Clear to auscultation.  Normal respiratory effort.    Melquiades was seen today for sore throat.    Diagnoses and all orders for this visit:    Sore throat  -     Group A Strep, Molecular    Encounter for observation for suspected exposure to other biological agents ruled out  -     COVID-19 Routine Screening; Future  -     COVID-19 Routine Screening    Upper respiratory tract infection, unspecified type      Strep screen negative.  May use Tylenol ibuprofen, saltwater gargles.  Isolation pending COVID-19 result.    Follow-up as needed if symptoms not improving with Recommended treatment next few days  "

## 2021-05-17 ENCOUNTER — IMMUNIZATION (OUTPATIENT)
Dept: FAMILY MEDICINE | Facility: CLINIC | Age: 26
End: 2021-05-17
Payer: COMMERCIAL

## 2021-05-17 DIAGNOSIS — Z23 NEED FOR VACCINATION: Primary | ICD-10-CM

## 2021-05-17 PROCEDURE — 91300 COVID-19, MRNA, LNP-S, PF, 30 MCG/0.3 ML DOSE VACCINE: CPT | Mod: PBBFAC | Performed by: FAMILY MEDICINE

## 2021-06-10 ENCOUNTER — IMMUNIZATION (OUTPATIENT)
Dept: FAMILY MEDICINE | Facility: CLINIC | Age: 26
End: 2021-06-10
Payer: COMMERCIAL

## 2021-06-10 DIAGNOSIS — Z23 NEED FOR VACCINATION: Primary | ICD-10-CM

## 2021-06-10 PROCEDURE — 0002A COVID-19, MRNA, LNP-S, PF, 30 MCG/0.3 ML DOSE VACCINE: CPT | Mod: PBBFAC | Performed by: FAMILY MEDICINE

## 2021-06-10 PROCEDURE — 91300 COVID-19, MRNA, LNP-S, PF, 30 MCG/0.3 ML DOSE VACCINE: CPT | Mod: PBBFAC | Performed by: FAMILY MEDICINE

## 2021-12-30 ENCOUNTER — TELEPHONE (OUTPATIENT)
Dept: FAMILY MEDICINE | Facility: CLINIC | Age: 26
End: 2021-12-30
Payer: COMMERCIAL

## 2022-07-14 ENCOUNTER — LAB VISIT (OUTPATIENT)
Dept: LAB | Facility: HOSPITAL | Age: 27
End: 2022-07-14
Attending: FAMILY MEDICINE
Payer: COMMERCIAL

## 2022-07-14 ENCOUNTER — OFFICE VISIT (OUTPATIENT)
Dept: FAMILY MEDICINE | Facility: CLINIC | Age: 27
End: 2022-07-14
Payer: COMMERCIAL

## 2022-07-14 VITALS
TEMPERATURE: 98 F | WEIGHT: 184.5 LBS | HEIGHT: 70 IN | BODY MASS INDEX: 26.41 KG/M2 | SYSTOLIC BLOOD PRESSURE: 113 MMHG | DIASTOLIC BLOOD PRESSURE: 48 MMHG | OXYGEN SATURATION: 98 % | HEART RATE: 58 BPM

## 2022-07-14 DIAGNOSIS — Z11.4 SCREENING FOR HIV (HUMAN IMMUNODEFICIENCY VIRUS): ICD-10-CM

## 2022-07-14 DIAGNOSIS — Z13.6 ENCOUNTER FOR LIPID SCREENING FOR CARDIOVASCULAR DISEASE: ICD-10-CM

## 2022-07-14 DIAGNOSIS — Z11.59 NEED FOR HEPATITIS C SCREENING TEST: ICD-10-CM

## 2022-07-14 DIAGNOSIS — Z13.220 ENCOUNTER FOR LIPID SCREENING FOR CARDIOVASCULAR DISEASE: ICD-10-CM

## 2022-07-14 DIAGNOSIS — R06.02 SOB (SHORTNESS OF BREATH): ICD-10-CM

## 2022-07-14 DIAGNOSIS — R42 EPISODIC LIGHTHEADEDNESS: ICD-10-CM

## 2022-07-14 DIAGNOSIS — R42 EPISODIC LIGHTHEADEDNESS: Primary | ICD-10-CM

## 2022-07-14 DIAGNOSIS — Z72.0 VAPES NICOTINE CONTAINING SUBSTANCE: ICD-10-CM

## 2022-07-14 LAB
CHOLEST SERPL-MCNC: 154 MG/DL (ref 120–199)
CHOLEST/HDLC SERPL: 2.6 {RATIO} (ref 2–5)
HDLC SERPL-MCNC: 59 MG/DL (ref 40–75)
HDLC SERPL: 38.3 % (ref 20–50)
LDLC SERPL CALC-MCNC: 82.2 MG/DL (ref 63–159)
NONHDLC SERPL-MCNC: 95 MG/DL
TRIGL SERPL-MCNC: 64 MG/DL (ref 30–150)
TSH SERPL DL<=0.005 MIU/L-ACNC: 0.88 UIU/ML (ref 0.4–4)

## 2022-07-14 PROCEDURE — 3078F PR MOST RECENT DIASTOLIC BLOOD PRESSURE < 80 MM HG: ICD-10-PCS | Mod: CPTII,S$GLB,, | Performed by: FAMILY MEDICINE

## 2022-07-14 PROCEDURE — 3074F PR MOST RECENT SYSTOLIC BLOOD PRESSURE < 130 MM HG: ICD-10-PCS | Mod: CPTII,S$GLB,, | Performed by: FAMILY MEDICINE

## 2022-07-14 PROCEDURE — 1159F PR MEDICATION LIST DOCUMENTED IN MEDICAL RECORD: ICD-10-PCS | Mod: CPTII,S$GLB,, | Performed by: FAMILY MEDICINE

## 2022-07-14 PROCEDURE — 36415 COLL VENOUS BLD VENIPUNCTURE: CPT | Mod: PO | Performed by: FAMILY MEDICINE

## 2022-07-14 PROCEDURE — 1159F MED LIST DOCD IN RCRD: CPT | Mod: CPTII,S$GLB,, | Performed by: FAMILY MEDICINE

## 2022-07-14 PROCEDURE — 3008F PR BODY MASS INDEX (BMI) DOCUMENTED: ICD-10-PCS | Mod: CPTII,S$GLB,, | Performed by: FAMILY MEDICINE

## 2022-07-14 PROCEDURE — 3078F DIAST BP <80 MM HG: CPT | Mod: CPTII,S$GLB,, | Performed by: FAMILY MEDICINE

## 2022-07-14 PROCEDURE — 99999 PR PBB SHADOW E&M-EST. PATIENT-LVL III: CPT | Mod: PBBFAC,,, | Performed by: FAMILY MEDICINE

## 2022-07-14 PROCEDURE — 87389 HIV-1 AG W/HIV-1&-2 AB AG IA: CPT | Performed by: FAMILY MEDICINE

## 2022-07-14 PROCEDURE — 80061 LIPID PANEL: CPT | Performed by: FAMILY MEDICINE

## 2022-07-14 PROCEDURE — 84443 ASSAY THYROID STIM HORMONE: CPT | Performed by: FAMILY MEDICINE

## 2022-07-14 PROCEDURE — 99214 OFFICE O/P EST MOD 30 MIN: CPT | Mod: S$GLB,,, | Performed by: FAMILY MEDICINE

## 2022-07-14 PROCEDURE — 86803 HEPATITIS C AB TEST: CPT | Performed by: FAMILY MEDICINE

## 2022-07-14 PROCEDURE — 99999 PR PBB SHADOW E&M-EST. PATIENT-LVL III: ICD-10-PCS | Mod: PBBFAC,,, | Performed by: FAMILY MEDICINE

## 2022-07-14 PROCEDURE — 3074F SYST BP LT 130 MM HG: CPT | Mod: CPTII,S$GLB,, | Performed by: FAMILY MEDICINE

## 2022-07-14 PROCEDURE — 3008F BODY MASS INDEX DOCD: CPT | Mod: CPTII,S$GLB,, | Performed by: FAMILY MEDICINE

## 2022-07-14 PROCEDURE — 99214 PR OFFICE/OUTPT VISIT, EST, LEVL IV, 30-39 MIN: ICD-10-PCS | Mod: S$GLB,,, | Performed by: FAMILY MEDICINE

## 2022-07-14 RX ORDER — PREDNISOLONE ACETATE 10 MG/ML
SUSPENSION/ DROPS OPHTHALMIC
COMMUNITY
Start: 2022-06-07

## 2022-07-14 RX ORDER — ALBUTEROL SULFATE 90 UG/1
2 AEROSOL, METERED RESPIRATORY (INHALATION) EVERY 6 HOURS PRN
COMMUNITY
Start: 2022-07-13

## 2022-07-14 NOTE — PROGRESS NOTES
Patient presents follow-up emergency room visit as below.  He felt like he was having a hard time finishing taking a deep breath.  He had a couple of episodes refilled lightheaded.  He denied any chest pain.  ER evaluation was negative.  This occurred a couple times after he was doing weight training and 1 time when he was riding in the car.  Denied any palpitations or wheezing.  He does have a couple prior episodes of vasovagal syncope once when he was having his blood drawn and other time when he cut his finger.  He has had some stress recently.  Denies chronic anxiety or depression.  He has lost significant weight the past over years have starting exercise and diet program.    Cayla Balderrama, NP - 07/13/2022 9:29 AM CDT  Formatting of this note is different from the original.      Triage Note Reviewed    History     Chief Complaint   Patient presents with    Shortness of Breath     History of Present IllnessPatient is a 26-year-old male here for evaluation due to shortness of breath or the inability to take a full deep breath. He denies a cough or palpitations. Symptoms started 2 days prior.    Review of Systems   Constitutional: Positive for activity change. Negative for fever.   HENT: Negative for congestion.   Respiratory: Positive for shortness of breath. Negative for cough.   Cardiovascular: Negative for leg swelling.   Gastrointestinal: Negative for abdominal pain, nausea and vomiting.   Neurological: Negative for headaches.   Psychiatric/Behavioral: Negative for confusion.   All other systems reviewed and are negative.        Allergies   Allergen Reactions    Bactrim [Sulfamethoxazole-Trimethoprim] Swelling    Sulfa (Sulfonamide Antibiotics) Swelling    Penicillin G Rash     History reviewed. No pertinent past medical history.    Past Surgical History:   Procedure Laterality Date    Appendectomy       Family History   Problem Relation Age of Onset    Hypertension Mother    Diabetes Mother     "Heart attack Mother    Diabetes Father    Lymphoma Father     Social History     Tobacco Use    Smoking status: Former Smoker   Packs/day: 0.25   Types: Cigarettes    Smokeless tobacco: Never Used   Vaping Use    Vaping Use: Every day   Substance Use Topics    Alcohol use: Yes    Drug use: Not Currently   Types: Marijuana     Smoking Cessation Program     E-Cigarette/Vaping    E-cigarette/Vaping Use Current Every Day User     Physical Exam     Visit Vitals  /76   Pulse 63   Temp 98.1 °F (36.7 °C) (Oral)   Resp 18   Ht 5' 10" (1.778 m)   Wt 185 lb (83.9 kg)   SpO2 98%   BMI 26.54 kg/m²     Physical Exam  Vitals and nursing note reviewed.   Constitutional:   General: He is not in acute distress.  Appearance: He is well-developed.   HENT:   Nose: Nose normal.   Mouth/Throat:   Pharynx: No oropharyngeal exudate.   Cardiovascular:   Rate and Rhythm: Bradycardia present.   Pulmonary:   Effort: Pulmonary effort is normal.   Breath sounds: Normal breath sounds. No wheezing.   Comments: Breath sounds are clear and equal bilaterally  Abdominal:   Palpations: Abdomen is soft.   Tenderness: There is no abdominal tenderness.   Skin:  General: Skin is warm and dry.   Neurological:   Mental Status: He is alert and oriented to person, place, and time.   Psychiatric:   Behavior: Behavior normal.     ED Course     Labs Reviewed   CBC WITH DIFFERENTIAL   COMPREHENSIVE METABOLIC PANEL   D-DIMER   COVID-19/RSV/INFLUENZA A & B PCR   GLOMERULAR FILTRATION RATE     Lab Results for last 36Hrs:  Recent Results (from the past 36 hour(s))   CBC with Differential   Collection Time: 07/13/22 9:03 AM   Result Value Ref Range   WBC 6.5 4.4 - 11.2 10*3/uL   RBC 5.09 4.70 - 6.10 10*6/uL   HGB 15.3 14.0 - 18.0 g/dL   HCT 42.5 42.0 - 52.0 %   MCV 83.5 80.0 - 94.0 fL   MCH 30.1 27.0 - 31.0 pg   MCHC 36.0 33.0 - 37.0 g/dL   RDW 11.8 11.5 - 14.5 %   Platelet Count 223 130 - 375 10*3/uL   MPV 10.4 8.7 - 13.0 fL   Neutrophils Percent 45.3 " 36.0 - 66.0 %   Lymphocytes Percent 42.9 21.0 - 50.0 %   Monocytes Percent 9.4 2.0 - 10.0 %   Eosinophils Percent 2.0 0.0 - 10.0 %   Basophils Percent 0.5 0.0 - 1.0 %   Immature Granulocyte % 0.2 0.0 - 0.4 %   Neutrophils Absolute 3.0 1.4 - 6.5 10*3/uL   Lymphocytes Absolute 2.8 1.2 - 3.4 10*3/uL   Monocytes Absolute 0.6 0.1 - 1.0 10*3/uL   Eosinophils Absolute 0.1 0.0 - 0.7 10*3/uL   Basophils Absolute 0.0 0.0 - 0.2 10*3/uL   # Immature Granulocyte 0.01 0.00 - 0.03 10*3/uL   Comprehensive metabolic panel   Collection Time: 07/13/22 9:03 AM   Result Value Ref Range   Glucose 85 65 - 99 mg/dL   Sodium 140 136 - 144 mmol/L   Potassium 4.0 3.6 - 5.1 mmol/L   Chloride 105 101 - 111 mmol/L   CO2 25 22 - 32 mmol/L   BUN 12 8 - 20 mg/dL   Calcium 10.0 8.9 - 10.3 mg/dL   Creatinine 1.10 0.90 - 1.30 mg/dL   Albumin 4.7 3.5 - 4.8 g/dL   Total Bilirubin 0.6 0.4 - 2.0 mg/dL   ALKP 42 28 - 116 U/L   Total Protein 7.0 6.1 - 7.9 g/dL   ALT 16 5 - 56 U/L   AST 20 12 - 40 U/L   Anion Gap 10 7 - 16 mmol/L   D-dimer, quantitative   Collection Time: 07/13/22 9:03 AM   Result Value Ref Range   D-Dimer <215 0 - 500 ng[FEU]/mL   Glomerular Filtration Rate   Collection Time: 07/13/22 9:03 AM   Result Value Ref Range   GFR Non African American >60 >59 mL/min   GFR African American >60 >59 mL/min   COVID-19/RSV/INFLUENZA A&B PCR   Collection Time: 07/13/22 9:05 AM   Result Value Ref Range   RSV PCR NEGATIVE NEGATIVE   Influenza A PCR NEGATIVE NEGATIVE   Influenza B PCR NEGATIVE NEGATIVE   SARS CoV 2 RNA NEGATIVE NEGATIVE     Diagnostic Results for last 36Hrs:  XR Chest AP Portable    Result Date: 7/13/2022  Indication: sob Comparison: None Impression: The lungs are clear. Heart size is normal. Electronically signed by Juaquin Vasquez MD on 7/13/2022 9:33 AM     Wet Read Results   XR Chest AP Portable   Final Result       Medications   ipratropium-albuteroL (DUONEB) 0.5 mg-3 mg(2.5 mg base)/3 mL nebulizer solution 3 mL (3 mLs Nebulization $Given  7/13/22 1000)     Procedures        MDM    KG reveals a sinus bradycardia, COVID RSV and influenza are negative. D-dimer is within normal limits, no other significant abnormality on blood work.  X-ray of the chest shows no evidence of acute abnormality per radiologist interpretation.  Patient was given a DuoNeb, he does report improvement in shortness of breath upon reevaluation. He will be sent home on an albuterol inhaler. He is to follow-up with PCP. Return here for worsening or further concerns.    Prior to Admission medications   Medication Sig Start Date End Date Taking?   albuterol (VENTOLIN) 90 mcg/actuation HFAA inhaler Inhale 2 puffs into the lungs every 6 (six) hours as needed for Wheezing 7/13/22   azithromycin (Zithromax Z-Beto) 250 MG tablet Take two 250mg tablets PO on day 1, then one 250mg tablet PO daily for 4 days. 6/29/20   dexbrompheniramn-chlophedianol (Chlo Hist) 1-12.5 mg/5 mL Soln 10 ML PO q 8 hours PRN cough/congestion 6/29/20     ED Critical Care Time        Diagnosis:    Final diagnoses:   SOB (shortness of breath)     VANDANA MINA Natalie, NP  07/13/22 Branden      Melquiades was seen today for follow-up.    Diagnoses and all orders for this visit:    Episodic lightheadedness  -     Echo; Future  -     TSH; Future    SOB (shortness of breath)  -     Echo; Future    Vapes nicotine containing substance    Screening for HIV (human immunodeficiency virus)  -     HIV 1/2 Ag/Ab (4th Gen); Future    Need for hepatitis C screening test  -     Hepatitis C Antibody; Future    Encounter for lipid screening for cardiovascular disease  -     Lipid Panel; Future    Recommend stop vaping..  Limited additional valuation as above.  Follow-up if recurrent symptoms.              Past Medical History:  Past Medical History:   Diagnosis Date    Allergy     COVID-19 [facility] testing [facility]    COVID-19 ruled out by laboratory testing 06/06/2022  "   [facility]    COVID-19 virus infection 06/29/2020     Past Surgical History:   Procedure Laterality Date    APPENDECTOMY  5/2011     Review of patient's allergies indicates:   Allergen Reactions    Amoxicillin      Other reaction(s): Rash    Bactrim  [sulfamethoxazole-trimethoprim]      Other reaction(s): Hives    Penicillins      Other reaction(s): Rash  Other reaction(s): Hives    Sulfa (sulfonamide antibiotics)      Other reaction(s): Hives  Other reaction(s): Edema     Current Outpatient Medications on File Prior to Visit   Medication Sig Dispense Refill    prednisoLONE acetate (PRED FORTE) 1 % DrpS Place into both eyes.      albuterol (PROVENTIL/VENTOLIN HFA) 90 mcg/actuation inhaler Inhale 2 puffs into the lungs every 6 (six) hours as needed.       No current facility-administered medications on file prior to visit.     Social History     Socioeconomic History    Marital status: Single   Tobacco Use    Smoking status: Current Every Day Smoker     Types: Vaping with nicotine   Substance and Sexual Activity    Alcohol use: Yes    Drug use: No     Family History   Problem Relation Age of Onset    Diabetes Father     Diabetes Paternal Grandmother            ROS:  GENERAL: No fever, chills,  or significant weight changes.   CARDIOVASCULAR: Denies chest pain, PND, orthopnea or reduced exercise tolerance.  ABDOMEN: Appetite fine. Denies diarrhea, abdominal pain, hematemesis or blood in stool.  URINARY: No flank pain, dysuria or hematuria.    Vitals:    07/14/22 1008   BP: (!) 113/48   Pulse: (!) 58   Temp: 97.9 °F (36.6 °C)   TempSrc: Temporal   SpO2: 98%   Weight: 83.7 kg (184 lb 8 oz)   Height: 5' 10" (1.778 m)     Wt Readings from Last 3 Encounters:   07/14/22 83.7 kg (184 lb 8 oz)   12/10/20 90.7 kg (200 lb)   01/22/20 94.3 kg (208 lb)       OBJECTIVE:   APPEARANCE: Well nourished, well developed, in no acute distress.    HEAD: Normocephalic.  Atraumatic.  No sinus " tenderness.  EYES:   Right eye: Pupil reactive.  Conjunctiva clear.    Left eye: Pupil reactive.  Conjunctiva clear.  EOMI.    EARS: TM's intact. Light reflex normal. No retraction or perforation.    NOSE:  clear.  MOUTH & THROAT:  No pharyngeal erythema or exudate. No lesions.  NECK: Supple. No bruits.  No JVD.  No cervical lymphadenopathy.  No thyromegaly.    CHEST: Breath sounds clear bilaterally.  Normal respiratory effort  CARDIOVASCULAR: Normal rate.  Regular rhythm.  No murmurs.  No rub.  No gallops.  ABDOMEN: Bowel sounds normal.  Soft.  No tenderness.  No organomegaly.  PERIPHERAL VASCULAR: No cyanosis.  No clubbing.  No edema.  NEUROLOGIC: No focal findings.  MENTAL STATUS: Alert.  Oriented x 3.

## 2022-07-15 LAB
HCV AB SERPL QL IA: NEGATIVE
HIV 1+2 AB+HIV1 P24 AG SERPL QL IA: NEGATIVE

## 2022-07-18 ENCOUNTER — HOSPITAL ENCOUNTER (OUTPATIENT)
Dept: CARDIOLOGY | Facility: HOSPITAL | Age: 27
Discharge: HOME | End: 2022-07-18
Attending: FAMILY MEDICINE
Payer: COMMERCIAL

## 2022-07-18 VITALS
HEART RATE: 52 BPM | WEIGHT: 184 LBS | HEIGHT: 70 IN | BODY MASS INDEX: 26.34 KG/M2 | DIASTOLIC BLOOD PRESSURE: 48 MMHG | SYSTOLIC BLOOD PRESSURE: 113 MMHG

## 2022-07-18 DIAGNOSIS — R06.02 SOB (SHORTNESS OF BREATH): ICD-10-CM

## 2022-07-18 DIAGNOSIS — R42 EPISODIC LIGHTHEADEDNESS: ICD-10-CM

## 2022-07-18 LAB
AORTIC ROOT ANNULUS: 3.09 CM
ASCENDING AORTA: 2.44 CM
AV INDEX (PROSTH): 0.64
AV MEAN GRADIENT: 3 MMHG
AV PEAK GRADIENT: 6 MMHG
AV VALVE AREA: 2.27 CM2
AV VELOCITY RATIO: 0.65
BSA FOR ECHO PROCEDURE: 2.03 M2
CV ECHO LV RWT: 0.46 CM
DOP CALC AO PEAK VEL: 1.23 M/S
DOP CALC AO VTI: 27.5 CM
DOP CALC LVOT AREA: 3.5 CM2
DOP CALC LVOT DIAMETER: 2.12 CM
DOP CALC LVOT PEAK VEL: 0.8 M/S
DOP CALC LVOT STROKE VOLUME: 62.45 CM3
DOP CALC RVOT PEAK VEL: 0.76 M/S
DOP CALC RVOT VTI: 19.1 CM
DOP CALCLVOT PEAK VEL VTI: 17.7 CM
E WAVE DECELERATION TIME: 200.6 MSEC
E/A RATIO: 2.96
E/E' RATIO: 4.61 M/S
ECHO LV POSTERIOR WALL: 1.14 CM (ref 0.6–1.1)
EJECTION FRACTION: 60 %
FRACTIONAL SHORTENING: 42 % (ref 28–44)
INTERVENTRICULAR SEPTUM: 1 CM (ref 0.6–1.1)
IVRT: 97.05 MSEC
LA MAJOR: 5.79 CM
LA MINOR: 3.69 CM
LA WIDTH: 3.7 CM
LEFT ATRIUM SIZE: 3.95 CM
LEFT ATRIUM VOLUME INDEX MOD: 26.5 ML/M2
LEFT ATRIUM VOLUME INDEX: 27.9 ML/M2
LEFT ATRIUM VOLUME MOD: 53.25 CM3
LEFT ATRIUM VOLUME: 55.99 CM3
LEFT INTERNAL DIMENSION IN SYSTOLE: 2.87 CM (ref 2.1–4)
LEFT VENTRICLE DIASTOLIC VOLUME INDEX: 56.8 ML/M2
LEFT VENTRICLE DIASTOLIC VOLUME: 114.17 ML
LEFT VENTRICLE MASS INDEX: 97 G/M2
LEFT VENTRICLE SYSTOLIC VOLUME INDEX: 15.6 ML/M2
LEFT VENTRICLE SYSTOLIC VOLUME: 31.36 ML
LEFT VENTRICULAR INTERNAL DIMENSION IN DIASTOLE: 4.93 CM (ref 3.5–6)
LEFT VENTRICULAR MASS: 194.93 G
LV LATERAL E/E' RATIO: 3.95 M/S
LV SEPTAL E/E' RATIO: 5.53 M/S
LVOT MG: 1.48 MMHG
LVOT MV: 0.58 CM/S
MV PEAK A VEL: 0.28 M/S
MV PEAK E VEL: 0.83 M/S
MV STENOSIS PRESSURE HALF TIME: 58.17 MS
MV VALVE AREA P 1/2 METHOD: 3.78 CM2
PISA TR MAX VEL: 2 M/S
PULM VEIN S/D RATIO: 1.62
PV MEAN GRADIENT: 1.29 MMHG
PV PEAK D VEL: 0.3 M/S
PV PEAK S VEL: 0.49 M/S
PV PEAK VELOCITY: 0.89 CM/S
RA MAJOR: 3.59 CM
RA WIDTH: 3.23 CM
RIGHT VENTRICULAR END-DIASTOLIC DIMENSION: 2.64 CM
SINUS: 2.69 CM
STJ: 2.83 CM
TDI LATERAL: 0.21 M/S
TDI SEPTAL: 0.15 M/S
TDI: 0.18 M/S
TR MAX PG: 16 MMHG
TRICUSPID ANNULAR PLANE SYSTOLIC EXCURSION: 1.88 CM

## 2022-07-18 PROCEDURE — 93306 ECHO (CUPID ONLY): ICD-10-PCS | Mod: 26,,, | Performed by: INTERNAL MEDICINE

## 2022-07-18 PROCEDURE — 93306 TTE W/DOPPLER COMPLETE: CPT | Mod: PO

## 2022-07-18 PROCEDURE — 93306 TTE W/DOPPLER COMPLETE: CPT | Mod: 26,,, | Performed by: INTERNAL MEDICINE

## 2023-04-10 ENCOUNTER — LAB VISIT (OUTPATIENT)
Dept: LAB | Facility: HOSPITAL | Age: 28
End: 2023-04-10
Attending: FAMILY MEDICINE
Payer: COMMERCIAL

## 2023-04-10 ENCOUNTER — OFFICE VISIT (OUTPATIENT)
Dept: FAMILY MEDICINE | Facility: CLINIC | Age: 28
End: 2023-04-10
Payer: COMMERCIAL

## 2023-04-10 VITALS
HEART RATE: 69 BPM | WEIGHT: 184.19 LBS | BODY MASS INDEX: 26.37 KG/M2 | SYSTOLIC BLOOD PRESSURE: 130 MMHG | TEMPERATURE: 98 F | HEIGHT: 70 IN | DIASTOLIC BLOOD PRESSURE: 60 MMHG

## 2023-04-10 DIAGNOSIS — Z20.2 EXPOSURE TO GONORRHEA: Primary | ICD-10-CM

## 2023-04-10 DIAGNOSIS — Z20.2 EXPOSURE TO GONORRHEA: ICD-10-CM

## 2023-04-10 PROBLEM — U07.1 COVID-19 VIRUS INFECTION: Status: RESOLVED | Noted: 2020-06-29 | Resolved: 2023-04-10

## 2023-04-10 PROCEDURE — 99999 PR PBB SHADOW E&M-EST. PATIENT-LVL III: CPT | Mod: PBBFAC,,, | Performed by: FAMILY MEDICINE

## 2023-04-10 PROCEDURE — 3008F PR BODY MASS INDEX (BMI) DOCUMENTED: ICD-10-PCS | Mod: CPTII,S$GLB,, | Performed by: FAMILY MEDICINE

## 2023-04-10 PROCEDURE — 87340 HEPATITIS B SURFACE AG IA: CPT | Performed by: FAMILY MEDICINE

## 2023-04-10 PROCEDURE — 87389 HIV-1 AG W/HIV-1&-2 AB AG IA: CPT | Performed by: FAMILY MEDICINE

## 2023-04-10 PROCEDURE — 86704 HEP B CORE ANTIBODY TOTAL: CPT | Performed by: FAMILY MEDICINE

## 2023-04-10 PROCEDURE — 87491 CHLMYD TRACH DNA AMP PROBE: CPT | Performed by: FAMILY MEDICINE

## 2023-04-10 PROCEDURE — 1159F PR MEDICATION LIST DOCUMENTED IN MEDICAL RECORD: ICD-10-PCS | Mod: CPTII,S$GLB,, | Performed by: FAMILY MEDICINE

## 2023-04-10 PROCEDURE — 99213 OFFICE O/P EST LOW 20 MIN: CPT | Mod: S$GLB,,, | Performed by: FAMILY MEDICINE

## 2023-04-10 PROCEDURE — 3075F PR MOST RECENT SYSTOLIC BLOOD PRESS GE 130-139MM HG: ICD-10-PCS | Mod: CPTII,S$GLB,, | Performed by: FAMILY MEDICINE

## 2023-04-10 PROCEDURE — 99999 PR PBB SHADOW E&M-EST. PATIENT-LVL III: ICD-10-PCS | Mod: PBBFAC,,, | Performed by: FAMILY MEDICINE

## 2023-04-10 PROCEDURE — 3078F DIAST BP <80 MM HG: CPT | Mod: CPTII,S$GLB,, | Performed by: FAMILY MEDICINE

## 2023-04-10 PROCEDURE — 99213 PR OFFICE/OUTPT VISIT, EST, LEVL III, 20-29 MIN: ICD-10-PCS | Mod: S$GLB,,, | Performed by: FAMILY MEDICINE

## 2023-04-10 PROCEDURE — 86803 HEPATITIS C AB TEST: CPT | Performed by: FAMILY MEDICINE

## 2023-04-10 PROCEDURE — 87591 N.GONORRHOEAE DNA AMP PROB: CPT | Performed by: FAMILY MEDICINE

## 2023-04-10 PROCEDURE — 3075F SYST BP GE 130 - 139MM HG: CPT | Mod: CPTII,S$GLB,, | Performed by: FAMILY MEDICINE

## 2023-04-10 PROCEDURE — 1159F MED LIST DOCD IN RCRD: CPT | Mod: CPTII,S$GLB,, | Performed by: FAMILY MEDICINE

## 2023-04-10 PROCEDURE — 36415 COLL VENOUS BLD VENIPUNCTURE: CPT | Mod: PO | Performed by: FAMILY MEDICINE

## 2023-04-10 PROCEDURE — 3078F PR MOST RECENT DIASTOLIC BLOOD PRESSURE < 80 MM HG: ICD-10-PCS | Mod: CPTII,S$GLB,, | Performed by: FAMILY MEDICINE

## 2023-04-10 PROCEDURE — 86592 SYPHILIS TEST NON-TREP QUAL: CPT | Performed by: FAMILY MEDICINE

## 2023-04-10 PROCEDURE — 3008F BODY MASS INDEX DOCD: CPT | Mod: CPTII,S$GLB,, | Performed by: FAMILY MEDICINE

## 2023-04-10 RX ORDER — CEFIXIME 400 MG/1
800 CAPSULE ORAL DAILY
Qty: 2 CAPSULE | Refills: 0 | Status: SHIPPED | OUTPATIENT
Start: 2023-04-10

## 2023-04-11 LAB
HBV CORE AB SERPL QL IA: NORMAL
HBV SURFACE AG SERPL QL IA: NORMAL
HCV AB SERPL QL IA: NORMAL
HIV 1+2 AB+HIV1 P24 AG SERPL QL IA: NORMAL
RPR SER QL: NORMAL

## 2023-04-11 NOTE — PROGRESS NOTES
States girlfriend tested positive for gonorrhea.  States other testing was negative.  Last sexual contact approximately 1 week ago.  Last prior partner contact 4 months ago.  Patient asymptomaticAnnalisa Arnett was seen today for std testing.    Diagnoses and all orders for this visit:    Exposure to gonorrhea  -     C. trachomatis/N. gonorrhoeae by AMP DNA  -     HIV 1/2 Ag/Ab (4th Gen); Future  -     RPR; Future  -     Hepatitis B Core Antibody, Total; Future  -     Hepatitis B Surface Antigen; Future  -     Hepatitis C Antibody; Future    Other orders  -     cefixime (SUPRAX) 400 mg Cap; Take 800 mg by mouth Daily.      Follow-up if symptoms.            Past Medical History:  Past Medical History:   Diagnosis Date    Allergy     COVID-19 ruled out by laboratory testing [facility]/11/2021    [facility]    COVID-19 ruled out by laboratory testing 06/06/2022    [facility]    COVID-19 virus infection 06/29/2020     Past Surgical History:   Procedure Laterality Date    APPENDECTOMY  5/2011     Review of patient's allergies indicates:   Allergen Reactions    Amoxicillin      Other reaction(s): Rash    Bactrim  [sulfamethoxazole-trimethoprim]      Other reaction(s): Hives    Penicillins      Other reaction(s): Rash  Other reaction(s): Hives    Sulfa (sulfonamide antibiotics)      Other reaction(s): Hives  Other reaction(s): Edema     Current Outpatient Medications on File Prior to Visit   Medication Sig Dispense Refill    prednisoLONE acetate (PRED FORTE) 1 % DrpS Place into both eyes.      albuterol (PROVENTIL/VENTOLIN HFA) 90 mcg/actuation inhaler Inhale 2 puffs into the lungs every 6 (six) hours as needed.       No current facility-administered medications on file prior to visit.     Social History     Socioeconomic History    Marital status: Single   Tobacco Use    Smoking status: Every Day     Types: Vaping with nicotine   Substance and Sexual Activity    Alcohol use: Yes    Drug use: No  "    Family History   Problem Relation Age of Onset    Diabetes Father     Diabetes Paternal Grandmother            ROS:  GENERAL: No fever, chills,  or significant weight changes.   CARDIOVASCULAR: Denies chest pain, PND, orthopnea or reduced exercise tolerance.  ABDOMEN: Appetite fine. Denies diarrhea, abdominal pain, hematemesis or blood in stool.  URINARY: No flank pain, dysuria or hematuria.    Vitals:    04/10/23 1420   BP: 130/60   Pulse: 69   Temp: 98.1 °F (36.7 °C)   TempSrc: Temporal   Weight: 83.6 kg (184 lb 3.2 oz)   Height: 5' 10" (1.778 m)       Wt Readings from Last 3 Encounters:   04/10/23 83.6 kg (184 lb 3.2 oz)   07/18/22 83.5 kg (184 lb)   07/14/22 83.7 kg (184 lb 8 oz)       APPEARANCE: Well nourished, well developed, in no acute distress.    HEAD: Normocephalic.  Atraumatic.  EYES:   Right eye: Pupil reactive.  Conjunctiva clear.    Left eye: Pupil reactive.  Conjunctiva clear.    NECK: Supple. MENTAL STATUS: Alert.  Oriented x 3.  Genitourinary:  No penile lesions or discharge.  "

## 2023-04-12 LAB
C TRACH DNA SPEC QL NAA+PROBE: NOT DETECTED
N GONORRHOEA DNA SPEC QL NAA+PROBE: NOT DETECTED

## 2024-10-21 ENCOUNTER — TELEPHONE (OUTPATIENT)
Dept: FAMILY MEDICINE | Facility: CLINIC | Age: 29
End: 2024-10-21
Payer: MEDICAID

## 2024-10-21 NOTE — TELEPHONE ENCOUNTER
Pt reports off and on abd pain, all over but mostly lower right.  Went to [facility] over weekend, CT was fine.  He reports he does not have an appendix, Appointment scheduled for tomorrow at 11, pt informed

## 2024-10-21 NOTE — TELEPHONE ENCOUNTER
----- Message from Adriane sent at 10/21/2024  1:58 PM CDT -----  Contact: [phone]  Type:  Sooner Apoointment Request    Caller is requesting a sooner appointment.  Caller declined first available appointment listed below.  Caller will not accept being placed on the waitlist and is requesting a message be sent to doctor.  Name of Caller:MICHAEL RETANA [[id number]]  When is the first available appointment?as soon as possible  Symptoms:having stomach pains  Would the patient rather a call back or a response via [facility]? Call back  Best Call Back Number: [phone]  Additional Information: mrn [id number]... patient no longer have BCBS.. he have MEDICAID

## 2024-10-22 ENCOUNTER — TELEPHONE (OUTPATIENT)
Dept: FAMILY MEDICINE | Facility: CLINIC | Age: 29
End: 2024-10-22
Payer: MEDICAID

## 2024-10-22 NOTE — TELEPHONE ENCOUNTER
----- Message from Nurse Soni sent at 10/22/2024 12:44 PM CDT -----  I called him but couldn't do tomorrow so I told him I would have you call him.  ----- Message -----  From: Rosy Pena  Sent: 10/22/2024  12:39 PM CDT  To: Judith YEAG[facility] Staff    Patient would like to reschedule the appointment he missed today. Call back number is  [phone]. Thx. EL

## 2024-11-04 ENCOUNTER — OFFICE VISIT (OUTPATIENT)
Dept: FAMILY MEDICINE | Facility: CLINIC | Age: 29
End: 2024-11-04
Payer: MEDICAID

## 2024-11-04 VITALS
HEART RATE: 75 BPM | BODY MASS INDEX: 27.96 KG/M2 | DIASTOLIC BLOOD PRESSURE: 70 MMHG | WEIGHT: 195.31 LBS | SYSTOLIC BLOOD PRESSURE: 103 MMHG | HEIGHT: 70 IN | TEMPERATURE: 98 F

## 2024-11-04 DIAGNOSIS — R10.31 RIGHT LOWER QUADRANT ABDOMINAL PAIN: Primary | ICD-10-CM

## 2024-11-04 PROCEDURE — 3008F BODY MASS INDEX DOCD: CPT | Mod: CPTII,,, | Performed by: FAMILY MEDICINE

## 2024-11-04 PROCEDURE — 99214 OFFICE O/P EST MOD 30 MIN: CPT | Mod: S$PBB,,, | Performed by: FAMILY MEDICINE

## 2024-11-04 PROCEDURE — 99213 OFFICE O/P EST LOW 20 MIN: CPT | Mod: PBBFAC,PO | Performed by: FAMILY MEDICINE

## 2024-11-04 PROCEDURE — G2211 COMPLEX E/M VISIT ADD ON: HCPCS | Mod: S$PBB,,, | Performed by: FAMILY MEDICINE

## 2024-11-04 PROCEDURE — 99999 PR PBB SHADOW E&M-EST. PATIENT-LVL III: CPT | Mod: PBBFAC,,, | Performed by: FAMILY MEDICINE

## 2024-11-04 PROCEDURE — 3074F SYST BP LT 130 MM HG: CPT | Mod: CPTII,,, | Performed by: FAMILY MEDICINE

## 2024-11-04 PROCEDURE — 3078F DIAST BP <80 MM HG: CPT | Mod: CPTII,,, | Performed by: FAMILY MEDICINE

## 2024-11-04 PROCEDURE — 1159F MED LIST DOCD IN RCRD: CPT | Mod: CPTII,,, | Performed by: FAMILY MEDICINE

## 2024-11-04 NOTE — PROGRESS NOTES
History of Present Illness    Mr. Washington reports intermittent sharp abdominal pains that began 2 weeks ago, initially in the right lower abdomen and spreading to other areas. Symptoms worsened with heavy lifting at workl. After 3 days of persistent pain, the patient sought care at an urgent care facility.    The same evening, pain intensified during work, leading to an emergency room visit. Urine and labs, as well as a CT, were performed, all unrevealing.   The most severe episode occurred around 1:00 or 2:00 a.m. on Sunday, waking the patient with intense pain before subsiding. Since then, the patient has similar pain but with reduced severity and frequency. Pain typically localizes to the right side of the abdomen, occasionally radiating to the groin and buttocks. During severe episodes, the patient also has chills radiating up the back, face, and ears.    Currently, pain episodes occur approximately every 4 days, lasting only a few seconds. Mr. Washington has been unable to correlate the pain with specific foods. He has avoided exercise since symptom onset but continues to perform physical tasks at work.    Mr. Washington denies fever, diarrhea, constipation, lumps in the groin, and difficulty urinating.      ROS:  General: -fever  Gastrointestinal: +abdominal pain, -diarrhea, -constipation  Genitourinary: -difficulty urinating          Melquiades was seen today for annual exam.    Diagnoses and all orders for this visit:    Right lower quadrant abdominal pain    Reviewed previous ER visit results (CT, urine and labs) which were clear  Determined watchful waiting approach appropriate given symptom improvement and negative previous workup  - Offered potential referral to GI doctor if symptoms persist or worsen  FOLLOW UP:  - Follow up if symptoms persist or worsen.  - Contact the office if referral to GI specialist is needed.               Past Medical History:  Past Medical History:   Diagnosis Date    Allergy     COVID-19  ruled out by laboratory testing 05/11/2021    [facility]    COVID-19 ruled out by laboratory testing 06/06/2022    [facility]    COVID-19 virus infection 06/29/2020     Past Surgical History:   Procedure Laterality Date    APPENDECTOMY  5/2011     Review of patient's allergies indicates:   Allergen Reactions    Amoxicillin      Other reaction(s): Rash    Bactrim  [sulfamethoxazole-trimethoprim]      Other reaction(s): Hives    Penicillins      Other reaction(s): Rash  Other reaction(s): Hives    Sulfa (sulfonamide antibiotics)      Other reaction(s): Hives  Other reaction(s): Edema     Current Outpatient Medications on File Prior to Visit   Medication Sig Dispense Refill    prednisoLONE acetate (PRED FORTE) 1 % DrpS Place into both eyes.      [DISCONTINUED] albuterol (PROVENTIL/VENTOLIN HFA) 90 mcg/actuation inhaler Inhale 2 puffs into the lungs every 6 (six) hours as needed.      [DISCONTINUED] cefixime (SUPRAX) 400 mg Cap Take 800 mg by mouth Daily. 2 capsule 0     No current facility-administered medications on file prior to visit.     Social History     Socioeconomic History    Marital status: Single   Tobacco Use    Smoking status: Every Day     Types: Vaping with nicotine    Tobacco comments:     Quit vaping earlier this year   Substance and Sexual Activity    Alcohol use: Yes    Drug use: No     Social Drivers of Health     Financial Resource Strain: Low Risk  (10/21/2024)    Overall Financial Resource Strain (CARDIA)     Difficulty of Paying Living Expenses: Not very hard   Food Insecurity: No Food Insecurity (10/21/2024)    Hunger Vital Sign     Worried About Running Out of Food in the Last Year: Never true     Ran Out of Food in the Last Year: Never true   Physical Activity: Sufficiently Active (10/21/2024)    Exercise Vital Sign     Days of Exercise per Week: 5 days     Minutes of Exercise per Session: 60 min   Stress: No Stress Concern Present (10/21/2024)    [place]  "[place] [employer] - Occupational Stress Questionnaire     Feeling of Stress : Not at all   Housing Stability: Unknown (10/21/2024)    Housing Stability Vital Sign     Unable to Pay for Housing in the Last Year: No     Family History   Problem Relation Name Age of Onset    Diabetes Father      Diabetes Paternal Grandmother             ROS:  GENERAL: No fever, chills,  or significant weight changes.   CARDIOVASCULAR: Denies chest pain, PND, orthopnea or reduced exercise tolerance.  ABDOMEN: Appetite fine. Denies diarrhea, hematemesis or blood in stool.  URINARY: No flank pain, dysuria or hematuria.    Vitals:    11/04/24 1330   BP: 103/70   Pulse: 75   Temp: 97.7 °F (36.5 °C)   TempSrc: Temporal   Weight: 88.6 kg (195 lb 4.8 oz)   Height: 5' 10" (1.778 m)     Wt Readings from Last 3 Encounters:   11/04/24 88.6 kg (195 lb 4.8 oz)   04/10/23 83.6 kg (184 lb 3.2 oz)   07/18/22 83.5 kg (184 lb)       OBJECTIVE:   APPEARANCE: Well nourished, well developed, in no acute distress.    HEAD: Normocephalic.  Atraumatic.  No sinus tenderness.  EYES:   Right eye: Pupil reactive.  Conjunctiva clear.    Left eye: Pupil reactive.  Conjunctiva clear.  EOMI.    EARS: TM's intact. Light reflex normal. No retraction or perforation.    NOSE:  clear.  MOUTH & THROAT:  No pharyngeal erythema or exudate. No lesions.  NECK: Supple. No bruits.  No JVD.  No cervical lymphadenopathy.  No thyromegaly.    CHEST: Breath sounds clear bilaterally.  Normal respiratory effort  CARDIOVASCULAR: Normal rate.  Regular rhythm.  No murmurs.  No rub.  No gallops.  ABDOMEN: Bowel sounds normal.  Soft.  No tenderness.  No organomegaly.  PERIPHERAL VASCULAR: No cyanosis.  No clubbing.  No edema.  NEUROLOGIC: No focal findings.  MENTAL STATUS: Alert.  Oriented x 3.        "